# Patient Record
Sex: FEMALE | NOT HISPANIC OR LATINO | URBAN - METROPOLITAN AREA
[De-identification: names, ages, dates, MRNs, and addresses within clinical notes are randomized per-mention and may not be internally consistent; named-entity substitution may affect disease eponyms.]

---

## 2018-09-27 ENCOUNTER — APPOINTMENT (RX ONLY)
Dept: URBAN - METROPOLITAN AREA CLINIC 368 | Facility: CLINIC | Age: 78
Setting detail: DERMATOLOGY
End: 2018-09-27

## 2018-09-27 DIAGNOSIS — L57.0 ACTINIC KERATOSIS: ICD-10-CM

## 2018-09-27 DIAGNOSIS — L82.1 OTHER SEBORRHEIC KERATOSIS: ICD-10-CM

## 2018-09-27 DIAGNOSIS — D485 NEOPLASM OF UNCERTAIN BEHAVIOR OF SKIN: ICD-10-CM

## 2018-09-27 PROBLEM — D48.5 NEOPLASM OF UNCERTAIN BEHAVIOR OF SKIN: Status: ACTIVE | Noted: 2018-09-27

## 2018-09-27 PROBLEM — E13.9 OTHER SPECIFIED DIABETES MELLITUS WITHOUT COMPLICATIONS: Status: ACTIVE | Noted: 2018-09-27

## 2018-09-27 PROCEDURE — ? LIQUID NITROGEN

## 2018-09-27 PROCEDURE — 11100: CPT | Mod: 59

## 2018-09-27 PROCEDURE — 99202 OFFICE O/P NEW SF 15 MIN: CPT | Mod: 25

## 2018-09-27 PROCEDURE — 17000 DESTRUCT PREMALG LESION: CPT

## 2018-09-27 PROCEDURE — ? COUNSELING

## 2018-09-27 PROCEDURE — 17003 DESTRUCT PREMALG LES 2-14: CPT

## 2018-09-27 PROCEDURE — ? TREATMENT REGIMEN

## 2018-09-27 PROCEDURE — ? BIOPSY BY SHAVE METHOD

## 2018-09-27 ASSESSMENT — LOCATION SIMPLE DESCRIPTION DERM
LOCATION SIMPLE: LEFT TEMPLE
LOCATION SIMPLE: LEFT CHEEK
LOCATION SIMPLE: LEFT LIP

## 2018-09-27 ASSESSMENT — LOCATION DETAILED DESCRIPTION DERM
LOCATION DETAILED: LEFT CENTRAL MALAR CHEEK
LOCATION DETAILED: LEFT UPPER CUTANEOUS LIP
LOCATION DETAILED: LEFT SUPERIOR CENTRAL MALAR CHEEK
LOCATION DETAILED: LEFT INFERIOR TEMPLE

## 2018-09-27 ASSESSMENT — LOCATION ZONE DERM
LOCATION ZONE: LIP
LOCATION ZONE: FACE

## 2018-09-27 NOTE — PROCEDURE: LIQUID NITROGEN
Post-Care Instructions: Aftercare Ln2 given
Number Of Freeze-Thaw Cycles: 1 freeze-thaw cycle
Render Post-Care Instructions In Note?: yes
Detail Level: Zone
Duration Of Freeze Thaw-Cycle (Seconds): 5

## 2018-11-08 ENCOUNTER — APPOINTMENT (RX ONLY)
Dept: URBAN - METROPOLITAN AREA CLINIC 368 | Facility: CLINIC | Age: 78
Setting detail: DERMATOLOGY
End: 2018-11-08

## 2018-11-08 PROBLEM — C44.01 BASAL CELL CARCINOMA OF SKIN OF LIP: Status: ACTIVE | Noted: 2018-11-08

## 2018-11-08 PROCEDURE — ? MOHS SURGERY

## 2018-11-08 PROCEDURE — 14060 TIS TRNFR E/N/E/L 10 SQ CM/<: CPT

## 2018-11-08 PROCEDURE — 17312 MOHS ADDL STAGE: CPT

## 2018-11-08 PROCEDURE — 17311 MOHS 1 STAGE H/N/HF/G: CPT

## 2018-11-08 PROCEDURE — ? COUNSELING

## 2018-11-08 NOTE — HPI: SKIN LESION (BASAL CELL CARCINOMA)
How Severe Is Your Skin Cancer?: moderate
Is This A New Presentation, Or A Follow-Up?: Basal Cell Carcinoma
When Was Basal Cell Biopsied? (Optional): 1940
Accession # (Optional): [id number]
Location From Outside Provider (Will Override Previously Chosen Location): Left Lip

## 2018-11-08 NOTE — PROCEDURE: MOHS SURGERY
Body Location Override (Optional - Billing Will Still Be Based On Selected Body Map Location If Applicable): Left Lip

## 2018-11-08 NOTE — PROCEDURE: MOHS SURGERY
[facility]     Prior to today's conversation, did individual have existing ACP documents? [] Yes  [x] No  If Yes, type of document:   Copy of previous document in electronic health record? [] Yes  [] No  If no, requested copy of document? [] Yes  [] No    Date of conversation:9/28/17 Location: [facility]    Participants:   [x] Patient    [] Prospective agent (not yet named in a document) / Other surrogate decision  maker / next of kin    Name:     Relationship to patient:    Phone number:    [] Healthcare agent (already designated in prior ACP document)    Name:     Relationship to patient:    Phone number:    Healthcare agent did not attend in person or via phone. Patient states he has discussion and she is willing to assume role. Other persons present:   Name:     Relationship to patient:   Name:     Relationship to patient:    Individual's Fears/Concerns about planning: No Concerns and document has been reviewed prior to discussion. Goals/Narrative of Conversation: Patient wanted to complete AMD so that his wishes were known that he wanted Genevive Broad to be his surrogate decision maker. Conversation topics for Individual    Has learned the following from prior experiences with serious illness:Per patient he does not have any experiences to draw from at this time. Identifies the following as important for living well:Patient advises living well to him is being able to take care of himself and interact with family and friends. \"What cultural, Restoration, spiritual, or personal beliefs (if any) do you have that might help you choose the care you want, or do not want? \"  Patient response:       \"Would you like to talk to someone about these beliefs or concerns? \"  Patient response:      Conversation topics for Agent / Prospective Agent    Understanding of the role of healthcare agent:Per patient she understands role and they have discussed prior to completing AMD.    Agent confirms Willingness to:   []Yes []No Accept role   [] Yes []No Talk with individual about his/her goals, values, & preferences   [] Yes [] No   Follow individual's decisions, even if do not agree with those    decisions   []Yes  []No Make decisions in difficult moments    Other questions/concerns about role of agent    Topics for Chronic Illness (if applicable): \"What do you understand about your (name of illness)? \"  Patient response:    \"What do you understand about the complications that may occur with your (name of illness)? \"  Patient response:    \"What do you understand about CPR? \" Patient response:    \"What would be an unacceptable outcome of CPR? \" Patient response:    [] Yes  [] No   Educated patient regarding differences between AMD and DDNR  [] Yes  [] No   If patient requested DDNR order, referred to provider for follow-up    [employer] ACP Facilitator: Kulwinder Gaston RN    Length (minutes): 60    The following was provided (check all that apply):   [x] Clarification of the goals of ACP    [x] Criteria for choosing a healthcare agent   [x] Written information on the planning process      Healthcare Decision Information Cards:   [x] Help with Breathing Facts   [x] Tube Feeding Facts   [x] CPR Facts      [x] Review of the completion of the advance directive    [] Review of existing advance directive       Meeting Outcomes:   [] ACP discussion completed   [] Advance directive form completed   [] Original of completed advance directive given to patient   [] Copy given to healthcare agent    [] Copy scanned to electronic medical record    If ACP discussion not completed, last interview topic discussed:     Follow-up plan:     [] Schedule follow-up conversation to continue planning   [] Referred individual to provider for additional questions/concerns    [] Individual will invite agent/prospective agent to next ACP appointment   [x] Recommended to review completed AMD annually or upon change in health status     [] This note routed to one or more involved healthcare providers Consent (Ear)/Introductory Paragraph: The rationale for Mohs was explained to the patient and consent was obtained. The risks, benefits and alternatives to therapy were discussed in detail. Specifically, the risks of ear deformity, infection, scarring, bleeding, prolonged wound healing, incomplete removal, allergy to anesthesia, nerve injury and recurrence were addressed. Prior to the procedure, the treatment site was clearly identified and confirmed by the patient. All components of Universal Protocol/PAUSE Rule completed.

## 2018-11-21 ENCOUNTER — APPOINTMENT (RX ONLY)
Dept: URBAN - METROPOLITAN AREA CLINIC 368 | Facility: CLINIC | Age: 78
Setting detail: DERMATOLOGY
End: 2018-11-21

## 2018-11-21 DIAGNOSIS — Z48.02 ENCOUNTER FOR REMOVAL OF SUTURES: ICD-10-CM

## 2018-11-21 PROCEDURE — ? SUTURE REMOVAL (GLOBAL PERIOD)

## 2018-11-21 ASSESSMENT — LOCATION DETAILED DESCRIPTION DERM: LOCATION DETAILED: LEFT UPPER CUTANEOUS LIP

## 2018-11-21 ASSESSMENT — LOCATION ZONE DERM: LOCATION ZONE: LIP

## 2018-11-21 ASSESSMENT — LOCATION SIMPLE DESCRIPTION DERM: LOCATION SIMPLE: LEFT LIP

## 2018-11-21 NOTE — PROCEDURE: SUTURE REMOVAL (GLOBAL PERIOD)
Detail Level: Detailed
Add 79758 Cpt? (Important Note: In 2017 The Use Of 50443 Is Being Tracked By Cms To Determine Future Global Period Reimbursement For Global Periods): no

## 2019-12-26 NOTE — PROCEDURE: MOHS SURGERY
No labs.   Stage 2: Additional Anesthesia Type: 1% lidocaine with 1:100,000 epinephrine and a 1:10 solution of 8.4% sodium bicarbonate

## 2020-06-05 ENCOUNTER — RX ONLY (OUTPATIENT)
Age: 80
Setting detail: RX ONLY
End: 2020-06-05

## 2020-06-05 ENCOUNTER — APPOINTMENT (RX ONLY)
Dept: URBAN - METROPOLITAN AREA CLINIC 371 | Facility: CLINIC | Age: 80
Setting detail: DERMATOLOGY
End: 2020-06-05

## 2020-06-05 DIAGNOSIS — L30.4 ERYTHEMA INTERTRIGO: ICD-10-CM

## 2020-06-05 DIAGNOSIS — L30.9 DERMATITIS, UNSPECIFIED: ICD-10-CM

## 2020-06-05 PROCEDURE — 99213 OFFICE O/P EST LOW 20 MIN: CPT

## 2020-06-05 PROCEDURE — ? COUNSELING

## 2020-06-05 PROCEDURE — ? TREATMENT REGIMEN

## 2020-06-05 PROCEDURE — ? PRESCRIPTION

## 2020-06-05 PROCEDURE — ? CONSENT FOR TELEMEDICINE VISIT OBTAINED

## 2020-06-05 RX ORDER — MOMETASONE FUROATE 1 MG/G
CREAM TOPICAL
Qty: 1 | Refills: 0 | Status: ERX | COMMUNITY
Start: 2020-06-05

## 2020-06-05 RX ORDER — NYSTATIN 100000 [USP'U]/G
CREAM TOPICAL
Qty: 1 | Refills: 2 | Status: ERX | COMMUNITY
Start: 2020-06-05

## 2020-06-05 RX ORDER — NYSTATIN AND TRIAMCINOLONE ACETONIDE 100000; 1 [USP'U]/G; MG/G
CREAM TOPICAL
Qty: 1 | Refills: 1 | Status: ERX | COMMUNITY
Start: 2020-06-05

## 2020-06-05 RX ORDER — TRIAMCINOLONE ACETONIDE 1 MG/G
CREAM TOPICAL
Qty: 1 | Refills: 2 | Status: ERX | COMMUNITY
Start: 2020-06-05

## 2020-06-05 RX ADMIN — NYSTATIN AND TRIAMCINOLONE ACETONIDE: 100000; 1 CREAM TOPICAL at 00:00

## 2020-06-05 RX ADMIN — MOMETASONE FUROATE: 1 CREAM TOPICAL at 00:00

## 2020-06-05 ASSESSMENT — LOCATION SIMPLE DESCRIPTION DERM
LOCATION SIMPLE: CHIN
LOCATION SIMPLE: ABDOMEN
LOCATION SIMPLE: RIGHT BREAST
LOCATION SIMPLE: LEFT BREAST

## 2020-06-05 ASSESSMENT — LOCATION DETAILED DESCRIPTION DERM
LOCATION DETAILED: RIGHT INFRAMAMMARY CREASE
LOCATION DETAILED: LEFT INFRAMAMMARY CREASE (INNER QUADRANT)
LOCATION DETAILED: PERIUMBILICAL SKIN
LOCATION DETAILED: RIGHT CHIN

## 2020-06-05 ASSESSMENT — LOCATION ZONE DERM
LOCATION ZONE: FACE
LOCATION ZONE: TRUNK

## 2020-06-05 NOTE — PROCEDURE: TREATMENT REGIMEN
Detail Level: Zone
Initiate Treatment: apply nystatin-triamcinolone twice daily to skin folds for 2-4 weeks
Initiate Treatment: apply mometasone twice daily to the chin for 2 weeks

## 2020-06-19 ENCOUNTER — APPOINTMENT (RX ONLY)
Dept: URBAN - METROPOLITAN AREA CLINIC 371 | Facility: CLINIC | Age: 80
Setting detail: DERMATOLOGY
End: 2020-06-19

## 2020-06-19 DIAGNOSIS — L30.4 ERYTHEMA INTERTRIGO: ICD-10-CM | Status: IMPROVED

## 2020-06-19 DIAGNOSIS — L30.9 DERMATITIS, UNSPECIFIED: ICD-10-CM | Status: IMPROVED

## 2020-06-19 PROBLEM — I10 ESSENTIAL (PRIMARY) HYPERTENSION: Status: ACTIVE | Noted: 2020-06-19

## 2020-06-19 PROBLEM — Z85.828 PERSONAL HISTORY OF OTHER MALIGNANT NEOPLASM OF SKIN: Status: ACTIVE | Noted: 2020-06-19

## 2020-06-19 PROCEDURE — ? TREATMENT REGIMEN

## 2020-06-19 PROCEDURE — 99213 OFFICE O/P EST LOW 20 MIN: CPT

## 2020-06-19 PROCEDURE — ? CONSENT FOR TELEMEDICINE VISIT OBTAINED

## 2020-06-19 PROCEDURE — ? COUNSELING

## 2020-06-19 ASSESSMENT — LOCATION SIMPLE DESCRIPTION DERM
LOCATION SIMPLE: LEFT BREAST
LOCATION SIMPLE: RIGHT BREAST
LOCATION SIMPLE: CHIN
LOCATION SIMPLE: ABDOMEN

## 2020-06-19 ASSESSMENT — LOCATION ZONE DERM
LOCATION ZONE: TRUNK
LOCATION ZONE: FACE

## 2020-06-19 ASSESSMENT — LOCATION DETAILED DESCRIPTION DERM
LOCATION DETAILED: PERIUMBILICAL SKIN
LOCATION DETAILED: LEFT INFRAMAMMARY CREASE (INNER QUADRANT)
LOCATION DETAILED: RIGHT CHIN
LOCATION DETAILED: RIGHT INFRAMAMMARY CREASE

## 2020-06-19 NOTE — PROCEDURE: TREATMENT REGIMEN
Continue Regimen: apply nystatin-triamcinolone twice daily to skin folds for additional 2 weeks
Detail Level: Zone
Continue Regimen: apply mometasone once daily to the chin for additional 2 weeks

## 2020-06-29 ENCOUNTER — RX ONLY (OUTPATIENT)
Age: 80
Setting detail: RX ONLY
End: 2020-06-29

## 2020-06-29 RX ORDER — TRIAMCINOLONE ACETONIDE 1 MG/G
CREAM TOPICAL
Qty: 1 | Refills: 2 | Status: ERX

## 2020-06-29 RX ORDER — NYSTATIN 100000 [USP'U]/G
CREAM TOPICAL
Qty: 1 | Refills: 2 | Status: ERX

## 2020-07-02 ENCOUNTER — APPOINTMENT (RX ONLY)
Dept: URBAN - METROPOLITAN AREA CLINIC 368 | Facility: CLINIC | Age: 80
Setting detail: DERMATOLOGY
End: 2020-07-02

## 2020-07-02 DIAGNOSIS — L30.4 ERYTHEMA INTERTRIGO: ICD-10-CM | Status: INADEQUATELY CONTROLLED

## 2020-07-02 PROBLEM — M12.9 ARTHROPATHY, UNSPECIFIED: Status: ACTIVE | Noted: 2020-07-02

## 2020-07-02 PROCEDURE — ? PRESCRIPTION

## 2020-07-02 PROCEDURE — ? COUNSELING

## 2020-07-02 PROCEDURE — ? CONSENT FOR TELEMEDICINE VISIT OBTAINED

## 2020-07-02 PROCEDURE — 99213 OFFICE O/P EST LOW 20 MIN: CPT

## 2020-07-02 PROCEDURE — ? TREATMENT REGIMEN

## 2020-07-02 RX ORDER — CICLOPIROX 10 MG/.96ML
SHAMPOO TOPICAL
Qty: 1 | Refills: 4 | Status: ERX | COMMUNITY
Start: 2020-07-02

## 2020-07-02 RX ADMIN — CICLOPIROX: 10 SHAMPOO TOPICAL at 00:00

## 2020-07-02 ASSESSMENT — LOCATION SIMPLE DESCRIPTION DERM
LOCATION SIMPLE: ABDOMEN
LOCATION SIMPLE: RIGHT BREAST
LOCATION SIMPLE: LEFT BREAST

## 2020-07-02 ASSESSMENT — LOCATION DETAILED DESCRIPTION DERM
LOCATION DETAILED: LEFT INFRAMAMMARY CREASE (INNER QUADRANT)
LOCATION DETAILED: RIGHT INFRAMAMMARY CREASE
LOCATION DETAILED: PERIUMBILICAL SKIN

## 2020-07-02 ASSESSMENT — LOCATION ZONE DERM: LOCATION ZONE: TRUNK

## 2020-07-02 NOTE — PROCEDURE: TREATMENT REGIMEN
Plan: Discussed with patient that rash does not resemble shingles as it crosses the midline and affects under-breasts and lower abdomen. No pain associated with the rash, just burning of the raw skin rubbing against each other.
Continue Regimen: Use ciclopirox shampoo as a body wash, lather and let sit 2-3 times weekly\\napply nystatin-triamcinolone twice daily to skin folds for 2 weeks\\nAfter two weeks apply just nystatin to the affected area BID for two additional weeks\\nwill continue with nystatin a few times a week to prevent reflare\\ncontinue with cornstarch to the area
Detail Level: Zone

## 2020-07-06 ENCOUNTER — RX ONLY (OUTPATIENT)
Age: 80
Setting detail: RX ONLY
End: 2020-07-06

## 2020-07-06 RX ORDER — NYSTATIN 100000 [USP'U]/G
CREAM TOPICAL
Qty: 1 | Refills: 2 | Status: CANCELLED
Stop reason: SDUPTHER

## 2020-08-13 ENCOUNTER — APPOINTMENT (RX ONLY)
Dept: URBAN - METROPOLITAN AREA CLINIC 368 | Facility: CLINIC | Age: 80
Setting detail: DERMATOLOGY
End: 2020-08-13

## 2020-08-13 DIAGNOSIS — L30.4 ERYTHEMA INTERTRIGO: ICD-10-CM

## 2020-08-13 PROCEDURE — ? ORDER TESTS

## 2020-08-13 PROCEDURE — 99213 OFFICE O/P EST LOW 20 MIN: CPT

## 2020-08-13 PROCEDURE — ? COUNSELING

## 2020-08-13 PROCEDURE — ? PRESCRIPTION

## 2020-08-13 PROCEDURE — ? TREATMENT REGIMEN

## 2020-08-13 RX ORDER — MICONAZOLE NITRATE 20 MG/G
POWDER TOPICAL
Qty: 1 | Refills: 0 | Status: ERX | COMMUNITY
Start: 2020-08-13

## 2020-08-13 RX ORDER — DOXYCYCLINE HYCLATE 100 MG/1
CAPSULE, GELATIN COATED ORAL
Qty: 10 | Refills: 0 | Status: ERX | COMMUNITY
Start: 2020-08-13

## 2020-08-13 RX ADMIN — MICONAZOLE NITRATE: 20 POWDER TOPICAL at 00:00

## 2020-08-13 RX ADMIN — DOXYCYCLINE HYCLATE: 100 CAPSULE, GELATIN COATED ORAL at 00:00

## 2020-08-13 ASSESSMENT — LOCATION DETAILED DESCRIPTION DERM
LOCATION DETAILED: LEFT INFRAMAMMARY CREASE (INNER QUADRANT)
LOCATION DETAILED: PERIUMBILICAL SKIN
LOCATION DETAILED: RIGHT INFRAMAMMARY CREASE

## 2020-08-13 ASSESSMENT — LOCATION SIMPLE DESCRIPTION DERM
LOCATION SIMPLE: LEFT BREAST
LOCATION SIMPLE: RIGHT BREAST
LOCATION SIMPLE: ABDOMEN

## 2020-08-13 ASSESSMENT — LOCATION ZONE DERM: LOCATION ZONE: TRUNK

## 2020-08-13 NOTE — PROCEDURE: TREATMENT REGIMEN
Continue Regimen: Use ciclopirox shampoo as a body wash, lather and let sit 2-3 times weekly\\napply nystatin-triamcinolone once daily to skin folds for 2 more weeks\\nwill continue with nystatin a few times a week to prevent reflare\\ncontinue with cornstarch to the area/ zeasorb
Plan: Discussed with patient that rash does not resemble shingles as it crosses the midline and affects under-breasts and lower abdomen. No pain associated with the rash, just burning of the raw skin rubbing against each other.
Detail Level: Zone

## 2020-09-15 ENCOUNTER — RX ONLY (OUTPATIENT)
Age: 80
Setting detail: RX ONLY
End: 2020-09-15

## 2020-09-15 RX ORDER — CLOTRIMAZOLE 10 MG/G
CREAM TOPICAL
Qty: 1 | Refills: 1 | Status: ERX | COMMUNITY
Start: 2020-09-15

## 2020-09-15 RX ORDER — NYSTATIN 100000 [USP'U]/G
CREAM TOPICAL
Qty: 1 | Refills: 2 | Status: ERX

## 2020-09-15 RX ORDER — FLUCONAZOLE 150 MG/1
TABLET ORAL
Qty: 2 | Refills: 0 | Status: CANCELLED | COMMUNITY
Start: 2020-09-15

## 2020-09-15 RX ORDER — CLOTRIMAZOLE 10 MG/G
CREAM TOPICAL
Qty: 1 | Refills: 2 | Status: CANCELLED | COMMUNITY
Start: 2020-09-15

## 2020-09-15 RX ORDER — FLUCONAZOLE 150 MG/1
TABLET ORAL
Qty: 2 | Refills: 0 | Status: ERX | COMMUNITY
Start: 2020-09-15

## 2021-08-18 ENCOUNTER — PREPPED CHART (OUTPATIENT)
Dept: URBAN - METROPOLITAN AREA CLINIC 64 | Facility: CLINIC | Age: 81
End: 2021-08-18

## 2021-08-18 PROBLEM — H43.811 POSTERIOR VITREOUS DETACHMENT: Status: STABILIZING | Noted: 2021-08-18

## 2021-08-18 PROBLEM — H04.122 DRY EYE SYNDROME: Chronic | Status: CHRONIC | Noted: 2021-08-18

## 2021-08-18 PROBLEM — H35.81 RETINAL EDEMA: Status: STABILIZING | Noted: 2021-08-18

## 2021-08-18 PROBLEM — E11.9 DIABETES, TYPE II, NO OCULAR COMPLICATIONS: Noted: 2021-08-18

## 2021-08-18 PROBLEM — H40.053 OCULAR HYPERTENSION: Noted: 2021-08-18

## 2021-08-18 PROBLEM — E11.9 DIABETES, TYPE II, NO OCULAR COMPLICATIONS: Status: STABILIZING | Noted: 2021-08-18

## 2021-08-18 PROBLEM — H35.81 RETINAL EDEMA: Noted: 2021-08-18

## 2021-08-18 PROBLEM — Z79.4 DIABETES, TYPE II, NO OCULAR COMPLICATIONS: Noted: 2021-08-18

## 2021-08-18 PROBLEM — Z86.69 HISTORY OF RETINAL DETACHMENT: Status: WELL-CONTROLLED | Noted: 2021-08-18

## 2021-08-18 PROBLEM — H43.811 POSTERIOR VITREOUS DETACHMENT: Noted: 2021-08-18

## 2021-08-18 PROBLEM — H35.373 EPIRETINAL MEMBRANE: Status: STABILIZING | Noted: 2021-08-18

## 2021-08-18 PROBLEM — H35.3132 DRY AMD, INTERMEDIATE DRY STAGE: Status: STABILIZING | Noted: 2021-08-18

## 2021-08-18 PROBLEM — H35.373 EPIRETINAL MEMBRANE: Noted: 2021-08-18

## 2021-08-18 PROBLEM — H04.122 DRY EYE SYNDROME: Noted: 2021-08-18

## 2021-08-18 PROBLEM — H35.3132 DRY AMD, INTERMEDIATE DRY STAGE: Noted: 2021-08-18

## 2021-08-18 PROBLEM — H18.12 BULLOUS KERATOPATHY: Noted: 2021-08-18

## 2021-08-18 PROBLEM — Z86.69 HISTORY OF RETINAL DETACHMENT: Noted: 2021-08-18

## 2021-08-18 PROBLEM — Z98.890: Noted: 2017-05-03

## 2022-03-29 ASSESSMENT — TONOMETRY
OD_IOP_MMHG: 14
OS_IOP_MMHG: 14
OD_IOP_MMHG: 34

## 2022-03-29 ASSESSMENT — VISUAL ACUITY: OD_SC: 20/30+1

## 2022-03-31 ENCOUNTER — FOLLOW UP (OUTPATIENT)
Dept: URBAN - METROPOLITAN AREA CLINIC 64 | Facility: CLINIC | Age: 82
End: 2022-03-31

## 2022-03-31 DIAGNOSIS — H35.373: ICD-10-CM

## 2022-03-31 DIAGNOSIS — E11.9: ICD-10-CM

## 2022-03-31 DIAGNOSIS — H35.3132: ICD-10-CM

## 2022-03-31 DIAGNOSIS — Z86.69: ICD-10-CM

## 2022-03-31 DIAGNOSIS — H35.81: ICD-10-CM

## 2022-03-31 PROCEDURE — 92134 CPTRZ OPH DX IMG PST SGM RTA: CPT

## 2022-03-31 PROCEDURE — 92014 COMPRE OPH EXAM EST PT 1/>: CPT

## 2022-03-31 PROCEDURE — 92202 OPSCPY EXTND ON/MAC DRAW: CPT

## 2022-03-31 ASSESSMENT — VISUAL ACUITY: OD_SC: 20/30-1

## 2022-03-31 ASSESSMENT — TONOMETRY
OD_IOP_MMHG: 19
OS_IOP_MMHG: 14

## 2022-10-26 ENCOUNTER — FOLLOW UP (OUTPATIENT)
Dept: URBAN - METROPOLITAN AREA CLINIC 64 | Facility: CLINIC | Age: 82
End: 2022-10-26

## 2022-10-26 DIAGNOSIS — H04.122: ICD-10-CM

## 2022-10-26 DIAGNOSIS — H40.1132: ICD-10-CM

## 2022-10-26 DIAGNOSIS — E11.9: ICD-10-CM

## 2022-10-26 DIAGNOSIS — H35.81: ICD-10-CM

## 2022-10-26 DIAGNOSIS — Q13.1: ICD-10-CM

## 2022-10-26 DIAGNOSIS — H18.12: ICD-10-CM

## 2022-10-26 DIAGNOSIS — H35.373: ICD-10-CM

## 2022-10-26 DIAGNOSIS — H35.3132: ICD-10-CM

## 2022-10-26 PROCEDURE — 92014 COMPRE OPH EXAM EST PT 1/>: CPT

## 2022-10-26 PROCEDURE — 92202 OPSCPY EXTND ON/MAC DRAW: CPT

## 2022-10-26 PROCEDURE — 92134 CPTRZ OPH DX IMG PST SGM RTA: CPT

## 2022-10-26 ASSESSMENT — TONOMETRY
OD_IOP_MMHG: 15
OS_IOP_MMHG: 13

## 2022-10-26 ASSESSMENT — VISUAL ACUITY: OD_SC: 20/30+1

## 2023-05-25 ENCOUNTER — FOLLOW UP (OUTPATIENT)
Dept: URBAN - METROPOLITAN AREA CLINIC 64 | Facility: CLINIC | Age: 83
End: 2023-05-25

## 2023-05-25 DIAGNOSIS — H35.3132: ICD-10-CM

## 2023-05-25 DIAGNOSIS — Q13.1: ICD-10-CM

## 2023-05-25 DIAGNOSIS — H35.373: ICD-10-CM

## 2023-05-25 DIAGNOSIS — E11.9: ICD-10-CM

## 2023-05-25 DIAGNOSIS — H43.811: ICD-10-CM

## 2023-05-25 DIAGNOSIS — H18.12: ICD-10-CM

## 2023-05-25 DIAGNOSIS — H04.122: ICD-10-CM

## 2023-05-25 DIAGNOSIS — H35.81: ICD-10-CM

## 2023-05-25 DIAGNOSIS — H40.1132: ICD-10-CM

## 2023-05-25 PROCEDURE — 92134 CPTRZ OPH DX IMG PST SGM RTA: CPT

## 2023-05-25 PROCEDURE — 92202 OPSCPY EXTND ON/MAC DRAW: CPT

## 2023-05-25 PROCEDURE — 92014 COMPRE OPH EXAM EST PT 1/>: CPT

## 2023-05-25 ASSESSMENT — TONOMETRY
OD_IOP_MMHG: 9
OS_IOP_MMHG: 14

## 2023-05-25 ASSESSMENT — VISUAL ACUITY: OD_SC: 20/25-2

## 2024-01-17 ENCOUNTER — FOLLOW UP (OUTPATIENT)
Dept: URBAN - METROPOLITAN AREA CLINIC 64 | Facility: CLINIC | Age: 84
End: 2024-01-17

## 2024-01-17 DIAGNOSIS — Z79.4: ICD-10-CM

## 2024-01-17 DIAGNOSIS — H35.373: ICD-10-CM

## 2024-01-17 DIAGNOSIS — H35.3132: ICD-10-CM

## 2024-01-17 DIAGNOSIS — E11.9: ICD-10-CM

## 2024-01-17 DIAGNOSIS — H18.12: ICD-10-CM

## 2024-01-17 DIAGNOSIS — H40.1133: ICD-10-CM

## 2024-01-17 DIAGNOSIS — H04.122: ICD-10-CM

## 2024-01-17 DIAGNOSIS — H35.81: ICD-10-CM

## 2024-01-17 DIAGNOSIS — H43.811: ICD-10-CM

## 2024-01-17 DIAGNOSIS — Q13.1: ICD-10-CM

## 2024-01-17 PROCEDURE — 92202 OPSCPY EXTND ON/MAC DRAW: CPT

## 2024-01-17 PROCEDURE — 92134 CPTRZ OPH DX IMG PST SGM RTA: CPT

## 2024-01-17 PROCEDURE — 92014 COMPRE OPH EXAM EST PT 1/>: CPT

## 2024-01-17 ASSESSMENT — VISUAL ACUITY: OD_SC: 20/25

## 2024-01-17 ASSESSMENT — TONOMETRY
OD_IOP_MMHG: 16
OS_IOP_MMHG: 11

## 2024-05-29 ENCOUNTER — FOLLOW UP (OUTPATIENT)
Dept: URBAN - METROPOLITAN AREA CLINIC 64 | Facility: CLINIC | Age: 84
End: 2024-05-29

## 2024-05-29 DIAGNOSIS — H40.1133: ICD-10-CM

## 2024-05-29 DIAGNOSIS — Q13.1: ICD-10-CM

## 2024-05-29 DIAGNOSIS — H35.81: ICD-10-CM

## 2024-05-29 DIAGNOSIS — E11.9: ICD-10-CM

## 2024-05-29 DIAGNOSIS — H43.811: ICD-10-CM

## 2024-05-29 DIAGNOSIS — H04.122: ICD-10-CM

## 2024-05-29 DIAGNOSIS — Z79.4: ICD-10-CM

## 2024-05-29 DIAGNOSIS — H35.373: ICD-10-CM

## 2024-05-29 DIAGNOSIS — H18.12: ICD-10-CM

## 2024-05-29 DIAGNOSIS — H35.3132: ICD-10-CM

## 2024-05-29 PROCEDURE — 92014 COMPRE OPH EXAM EST PT 1/>: CPT

## 2024-05-29 PROCEDURE — 92134 CPTRZ OPH DX IMG PST SGM RTA: CPT

## 2024-05-29 PROCEDURE — 92202 OPSCPY EXTND ON/MAC DRAW: CPT

## 2024-05-29 ASSESSMENT — TONOMETRY
OS_IOP_MMHG: 11
OD_IOP_MMHG: 11

## 2024-05-29 ASSESSMENT — VISUAL ACUITY: OD_SC: 20/30-2

## 2025-06-18 ENCOUNTER — FOLLOW UP (OUTPATIENT)
Dept: URBAN - METROPOLITAN AREA CLINIC 64 | Facility: CLINIC | Age: 85
End: 2025-06-18

## 2025-06-18 DIAGNOSIS — Q13.1: ICD-10-CM

## 2025-06-18 DIAGNOSIS — H35.81: ICD-10-CM

## 2025-06-18 DIAGNOSIS — H43.811: ICD-10-CM

## 2025-06-18 DIAGNOSIS — H35.3132: ICD-10-CM

## 2025-06-18 DIAGNOSIS — H04.122: ICD-10-CM

## 2025-06-18 DIAGNOSIS — E11.9: ICD-10-CM

## 2025-06-18 DIAGNOSIS — H40.1133: ICD-10-CM

## 2025-06-18 DIAGNOSIS — Z79.4: ICD-10-CM

## 2025-06-18 DIAGNOSIS — H35.373: ICD-10-CM

## 2025-06-18 DIAGNOSIS — H18.12: ICD-10-CM

## 2025-06-18 PROCEDURE — 92014 COMPRE OPH EXAM EST PT 1/>: CPT

## 2025-06-18 PROCEDURE — 92134 CPTRZ OPH DX IMG PST SGM RTA: CPT

## 2025-06-18 PROCEDURE — 92202 OPSCPY EXTND ON/MAC DRAW: CPT

## 2025-06-18 ASSESSMENT — TONOMETRY
OD_IOP_MMHG: 7
OS_IOP_MMHG: 12

## 2025-06-18 ASSESSMENT — VISUAL ACUITY: OD_SC: 20/30-2

## (undated) RX ORDER — BRIMONIDINE TARTRATE 2 MG/MG: 1 SOLUTION/ DROPS OPHTHALMIC